# Patient Record
Sex: MALE | Race: WHITE | Employment: FULL TIME | URBAN - METROPOLITAN AREA
[De-identification: names, ages, dates, MRNs, and addresses within clinical notes are randomized per-mention and may not be internally consistent; named-entity substitution may affect disease eponyms.]

---

## 2018-11-04 ENCOUNTER — APPOINTMENT (OUTPATIENT)
Dept: GENERAL RADIOLOGY | Age: 28
End: 2018-11-04
Attending: PHYSICIAN ASSISTANT
Payer: SELF-PAY

## 2018-11-04 ENCOUNTER — HOSPITAL ENCOUNTER (EMERGENCY)
Age: 28
Discharge: HOME | End: 2018-11-04
Attending: EMERGENCY MEDICINE
Payer: SELF-PAY

## 2018-11-04 VITALS
TEMPERATURE: 98.3 F | DIASTOLIC BLOOD PRESSURE: 72 MMHG | BODY MASS INDEX: 29.25 KG/M2 | WEIGHT: 182 LBS | HEART RATE: 92 BPM | OXYGEN SATURATION: 100 % | SYSTOLIC BLOOD PRESSURE: 132 MMHG | HEIGHT: 66 IN | RESPIRATION RATE: 16 BRPM

## 2018-11-04 DIAGNOSIS — S91.332A PUNCTURE WOUND OF PLANTAR ASPECT OF LEFT FOOT, INITIAL ENCOUNTER: Primary | ICD-10-CM

## 2018-11-04 PROCEDURE — 73630 X-RAY EXAM OF FOOT: CPT

## 2018-11-04 PROCEDURE — 74011250637 HC RX REV CODE- 250/637: Performed by: PHYSICIAN ASSISTANT

## 2018-11-04 PROCEDURE — 74011250636 HC RX REV CODE- 250/636: Performed by: EMERGENCY MEDICINE

## 2018-11-04 PROCEDURE — 90715 TDAP VACCINE 7 YRS/> IM: CPT | Performed by: EMERGENCY MEDICINE

## 2018-11-04 PROCEDURE — 99283 EMERGENCY DEPT VISIT LOW MDM: CPT

## 2018-11-04 PROCEDURE — 90471 IMMUNIZATION ADMIN: CPT

## 2018-11-04 PROCEDURE — 77030018836 HC SOL IRR NACL ICUM -A

## 2018-11-04 RX ORDER — IBUPROFEN 800 MG/1
800 TABLET ORAL
Qty: 30 TAB | Refills: 0 | Status: SHIPPED | OUTPATIENT
Start: 2018-11-04 | End: 2018-11-11

## 2018-11-04 RX ORDER — HYDROCODONE BITARTRATE AND ACETAMINOPHEN 5; 325 MG/1; MG/1
1 TABLET ORAL
Status: COMPLETED | OUTPATIENT
Start: 2018-11-04 | End: 2018-11-04

## 2018-11-04 RX ORDER — CIPROFLOXACIN 500 MG/1
500 TABLET ORAL 2 TIMES DAILY
Qty: 10 TAB | Refills: 0 | Status: SHIPPED | OUTPATIENT
Start: 2018-11-04 | End: 2018-11-09

## 2018-11-04 RX ORDER — CIPROFLOXACIN 500 MG/1
500 TABLET ORAL
Status: COMPLETED | OUTPATIENT
Start: 2018-11-04 | End: 2018-11-04

## 2018-11-04 RX ADMIN — CIPROFLOXACIN 500 MG: 500 TABLET, FILM COATED ORAL at 12:18

## 2018-11-04 RX ADMIN — TETANUS TOXOID, REDUCED DIPHTHERIA TOXOID AND ACELLULAR PERTUSSIS VACCINE, ADSORBED 0.5 ML: 5; 2.5; 8; 8; 2.5 SUSPENSION INTRAMUSCULAR at 12:18

## 2018-11-04 RX ADMIN — HYDROCODONE BITARTRATE AND ACETAMINOPHEN 1 TABLET: 5; 325 TABLET ORAL at 12:18

## 2018-11-04 NOTE — ED PROVIDER NOTES
24 y/o male with no significant PMHx, presenting with complaint of foot pain. The patient states he was walking out to his shed just prior to arrival, and accidentally stepped on a board with a nail sticking out of it. He sustained a puncture wound to the ball of his left foot. He was able to remove his shoe without difficulty, and did not see any foreign bodies inside the wound. His pain is currently 6/10, aching, non-radiating. He has not tried anything to relieve his pain. Tetanus vaccine is not up to date. He denies weakness, numbness, or any other injuries. The history is provided by the patient. History reviewed. No pertinent past medical history. Past Surgical History:  
Procedure Laterality Date  HX ORTHOPAEDIC    
 left foot History reviewed. No pertinent family history. Social History Socioeconomic History  Marital status: Not on file Spouse name: Not on file  Number of children: Not on file  Years of education: Not on file  Highest education level: Not on file Social Needs  Financial resource strain: Not on file  Food insecurity - worry: Not on file  Food insecurity - inability: Not on file  Transportation needs - medical: Not on file  Transportation needs - non-medical: Not on file Occupational History  Not on file Tobacco Use  Smoking status: Never Smoker  Smokeless tobacco: Never Used Substance and Sexual Activity  Alcohol use: No  
  Frequency: Never  Drug use: No  
 Sexual activity: Not on file Other Topics Concern  Not on file Social History Narrative  Not on file ALLERGIES: Patient has no known allergies. Review of Systems Constitutional: Negative for chills and fever. Respiratory: Negative for shortness of breath. Cardiovascular: Negative for chest pain. Gastrointestinal: Negative for diarrhea, nausea and vomiting. Musculoskeletal: Negative for myalgias. + left foot pain Skin: Positive for wound. Neurological: Negative for weakness and numbness. All other systems reviewed and are negative. Vitals:  
 11/04/18 1126 BP: 132/72 Pulse: 92 Resp: 16 Temp: 98.3 °F (36.8 °C) SpO2: 100% Weight: 82.6 kg (182 lb) Height: 5' 6\" (1.676 m) Physical Exam  
Constitutional: He is oriented to person, place, and time. He appears well-developed and well-nourished. No distress. HENT:  
Head: Normocephalic and atraumatic. Eyes: Conjunctivae and EOM are normal.  
Neck: Normal range of motion. Neck supple. Cardiovascular: Normal rate. Pulmonary/Chest: Effort normal. No respiratory distress. Musculoskeletal: No swelling, erythema, deformity, or bony tenderness of left foot. Toes warm with intact light touch sensation. Neurological: He is alert and oriented to person, place, and time. Skin: Skin is warm and dry. He is not diaphoretic. Plantar surface of left foot with small puncture wound in center of ball of foot, approx 3 mm diameter. Linear abrasion present just medial to puncture wound, superficial, approx 2 cm long, no active bleeding. Surrounding skin tender to palpation. Nursing note and vitals reviewed. MDM Number of Diagnoses or Management Options Puncture wound of plantar aspect of left foot, initial encounter:  
  
Amount and/or Complexity of Data Reviewed Tests in the radiology section of CPT®: ordered and reviewed Discuss the patient with other providers: yes (Dr. Ambrocio Fletcher, ED attending) Independent visualization of images, tracings, or specimens: yes (XR left foot) Patient Progress Patient progress: stable Procedures 24 y/o male with no significant PMHx, presenting with complaint of foot pain. Puncture wound present on exam, no visible foreign body present.  XR left foot, read by radiology and independently visualized and interpreted by myself, reveals no evidence of acute fractures or radiopaque foreign bodies. Tdap administered, wound irrigated with normal saline. Safe for discharge home with Rx for Cipro 500mg BID x5 days. Recommended PCP follow up this week. Strict ED return precautions discussed and provided in writing at time of discharge. The patient verbalized understanding and agreement with this plan.

## 2018-11-04 NOTE — ED NOTES
Pt given discharge instructions by Elio LAMAR he verbalizes an understanding pt stable at time of discharge assisted to car via wheelchair

## 2018-11-04 NOTE — DISCHARGE INSTRUCTIONS
Puncture Wounds: Care Instructions  Your Care Instructions    A puncture wound can happen anywhere on your body. These wounds tend to be narrower and deeper than cuts. A puncture wound is usually left open instead of being closed. This is because a puncture wound can be easily infected, and closing it can make infection even more likely. You will probably have a bandage over the wound. The doctor has checked you carefully, but problems can develop later. If you notice any problems or new symptoms, get medical treatment right away. Follow-up care is a key part of your treatment and safety. Be sure to make and go to all appointments, and call your doctor if you are having problems. It's also a good idea to know your test results and keep a list of the medicines you take. How can you care for yourself at home? · Keep the wound dry for the first 24 to 48 hours. After this, you can shower if your doctor okays it. Pat the wound dry. · Don't soak the wound, such as in a bathtub. Your doctor will tell you when it's safe to get the wound wet. · If your doctor told you how to care for your wound, follow your doctor's instructions. If you did not get instructions, follow this general advice:  ? After the first 24 to 48 hours, wash the wound with clean water 2 times a day. Don't use hydrogen peroxide or alcohol, which can slow healing. ? You may cover the wound with a thin layer of petroleum jelly, such as Vaseline, and a nonstick bandage. ? Apply more petroleum jelly and replace the bandage as needed. · Prop up the sore area on pillows anytime you sit or lie down during the next 3 days. Try to keep it above the level of your heart. This helps reduce swelling. · Avoid any activity that could cause your wound to get worse. · Be safe with medicines. Read and follow all instructions on the label. ? If the doctor gave you a prescription medicine for pain, take it as prescribed.   ? If you are not taking a prescription pain medicine, ask your doctor if you can take an over-the-counter medicine. · If your doctor prescribed antibiotics, take them as directed. Do not stop taking them just because you feel better. You need to take the full course of antibiotics. When should you call for help? Call your doctor now or seek immediate medical care if:    · You have new pain, or your pain gets worse.     · The wound starts to bleed, and blood soaks through the bandage. Oozing small amounts of blood is normal.     · The skin near the wound is cold or pale or changes color.     · You have tingling, weakness, or numbness near the wound.     · You have trouble moving the area near the wound.     · You have symptoms of infection, such as:  ? Increased pain, swelling, warmth, or redness around the wound. ? Red streaks leading from the wound. ? Pus draining from the wound. ? A fever.    Watch closely for changes in your health, and be sure to contact your doctor if:    · The wound is not closing (getting smaller).     · You do not get better as expected. Where can you learn more? Go to http://jarad-fina.info/. Enter W814 in the search box to learn more about \"Puncture Wounds: Care Instructions. \"  Current as of: November 20, 2017  Content Version: 11.8  © 2974-2451 [employer]. Care instructions adapted under license by [employer] (which disclaims liability or warranty for this information). If you have questions about a medical condition or this instruction, always ask your healthcare professional. [place] [address] any warranty or liability for your use of this information.

## 2018-11-04 NOTE — ED TRIAGE NOTES
Pt assisted to treatment area via wheelchair he states that he stepped on a merlene nail walking to the shed. He has a puncture with a small scratch beside it. He has pain to the ball of the foot going through to the mid foot.